# Patient Record
Sex: MALE | Race: WHITE | NOT HISPANIC OR LATINO | Employment: OTHER | URBAN - METROPOLITAN AREA
[De-identification: names, ages, dates, MRNs, and addresses within clinical notes are randomized per-mention and may not be internally consistent; named-entity substitution may affect disease eponyms.]

---

## 2023-12-07 ENCOUNTER — HOSPITAL ENCOUNTER (OUTPATIENT)
Dept: RADIATION ONCOLOGY | Facility: HOSPITAL | Age: 68
Setting detail: RADIATION/ONCOLOGY SERIES
Discharge: HOME | End: 2023-12-07
Payer: MEDICARE

## 2023-12-07 ENCOUNTER — OFFICE VISIT (OUTPATIENT)
Dept: RADIATION ONCOLOGY | Facility: HOSPITAL | Age: 68
End: 2023-12-07
Payer: MEDICARE

## 2023-12-07 VITALS
SYSTOLIC BLOOD PRESSURE: 148 MMHG | WEIGHT: 140.9 LBS | OXYGEN SATURATION: 95 % | HEIGHT: 68 IN | TEMPERATURE: 96.6 F | RESPIRATION RATE: 18 BRPM | BODY MASS INDEX: 21.35 KG/M2 | HEART RATE: 85 BPM | DIASTOLIC BLOOD PRESSURE: 85 MMHG

## 2023-12-07 DIAGNOSIS — C61 PROSTATE CANCER: Primary | ICD-10-CM

## 2023-12-07 PROCEDURE — G0463 HOSPITAL OUTPT CLINIC VISIT: HCPCS | Performed by: RADIOLOGY

## 2023-12-07 RX ORDER — ALBUTEROL SULFATE 90 UG/1
AEROSOL, METERED RESPIRATORY (INHALATION)
COMMUNITY

## 2023-12-07 RX ORDER — TAMSULOSIN HYDROCHLORIDE 0.4 MG/1
1 CAPSULE ORAL EVERY MORNING
COMMUNITY

## 2023-12-07 RX ORDER — LISINOPRIL 10 MG/1
10 TABLET ORAL EVERY MORNING
COMMUNITY
Start: 2023-10-17

## 2023-12-07 RX ORDER — FLUTICASONE FUROATE, UMECLIDINIUM BROMIDE AND VILANTEROL TRIFENATATE 100; 62.5; 25 UG/1; UG/1; UG/1
POWDER RESPIRATORY (INHALATION)
COMMUNITY

## 2023-12-07 NOTE — PROGRESS NOTES
CONSULTATION NOTE      :                                                          1955  DATE OF CONSULTATION:                       2023   REQUESTING PHYSICIAN:                   Gurpreet Hawley DO  REASON FOR CONSULTATION:           Prostate Cancer   Cancer Staging   Stage IIC (cT1c, cN0, cM0, PSA: 10.5, Grade Group: 4)    Thank you for requesting my services in evaluation of this pleasant individual.  I am seeing them in outpatient consultation regarding a diagnosis of prostate cancer.     BRIEF HISTORY:  The patient is a very pleasant 68 y.o. 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  This prompted further evaluation and he was seen by Dr. Hawley who performed a transrectal ultrasound-guided biopsy of the prostate.  This revealed a [place] 3+5 = 8 adenocarcinoma involving a single core from the left prostate involving 40% of the biopsy length, and a Joslyn 3+4 = 7 adenocarcinoma from the right side of the prostate involving 4% of the total biopsy length.  He then underwent a PSMA PET/CT scan which identified a small focus of hypermetabolic activity in the prostate gland itself, but no evidence of regional or distant disease.  He discussed several different treatment options and is now presenting for discussion related to definitive radiation therapy.  From a symptomatic standpoint, he reports an IPSS score of 10, most significant for nocturia x 3.  He is currently taking Flomax with good results.  He reports a high level of erectile function and he denies any ongoing gastrointestinal complaints.    Allergy: No Known Allergies    Social History:   Social History     Socioeconomic History    Marital status:    Tobacco Use    Smoking status: Former     Types: Cigarettes    Smokeless tobacco: Never    Tobacco comments:     Quit 10  years ago   Substance and Sexual Activity    Alcohol use: Not Currently    Drug use: Never    Sexual activity: Defer       Past Medical History:   Past Medical History:   Diagnosis Date    Black lung     Hypertension     Prostate cancer        Family History: family history includes Colon cancer in his brother; No Known Problems in his mother.     Surgical History:   Past Surgical History:   Procedure Laterality Date    COLONOSCOPY      6 years    HERNIA REPAIR          Review of Systems:   Review of Systems   Musculoskeletal:  Positive for arthralgias.   All other systems reviewed and are negative.      IPSS Questionnaire (AUA-7):  Over the past month…    1)  Incomplete Emptying  How often have you had a sensation of not emptying your bladder?  1 - Less than 1 time in 5   2)  Frequency  How often have you had to urinate less than every two hours? 1 - Less than 1 time in 5   3)  Intermittency  How often have you found you stopped and started again several times when you urinated?  1 - Less than 1 time in 5   4) Urgency  How often have you found it difficult to postpone urination?  2 - Less than half the time   5) Weak Stream  How often have you had a weak urinary stream?  1 - Less than 1 time in 5   6) Straining  How often have you had to push or strain to begin urination?  1 - Less than 1 time in 5   7) Nocturia  How many times did you typically get up at night to urinate?  3 - 3 times   Total Score:  10       Quality of life due to urinary symptoms:  If you were to spend the rest of your life with your urinary condition the way it is now, how would you feel about that? 2-Mostly Satisfied   Urine Leakage (Incontinence) 0-No Leakage     Sexual Health Inventory  Current Status    1)  How do you rate your confidence that you could achieve and keep an erection? 5-Very High   2) When you had erections with sexual stimulation, how often were your erections hard enough for penetration (entering your partner)? 5-Almost  "always or always   3)  During sexual intercourse, how often were you able to maintain your erection after you had penetrated (entered) into your partner? 5-Almost always or always   4) During sexual intercourse, how difficult was it to maintain your erection to completion of intercourse? 5-Not difficult   5) When you attempted sexual intercourse, how often was it satisfactory to you? 5-Almost always or always   Total Score: 25       Bowel Health Inventory  Current Status: 0-No problems, no rectal bleeding, no discharge, less then 5 bowel movements a day        Objective   VITAL SIGNS:   Vitals:    12/07/23 1321   BP: 148/85   Pulse: 85   Resp: 18   Temp: 96.6 °F (35.9 °C)   TempSrc: Temporal   SpO2: 95%   Weight: 63.9 kg (140 lb 14.4 oz)   Height: 172.7 cm (68\")   PainSc: 0-No pain        Karnofsky score: 80       Physical Exam:   Physical Exam  Vitals and nursing note reviewed.   Constitutional:       General: He is not in acute distress.     Appearance: He is well-developed.   HENT:      Head: Normocephalic and atraumatic.   Eyes:      Conjunctiva/sclera: Conjunctivae normal.      Pupils: Pupils are equal, round, and reactive to light.   Cardiovascular:      Rate and Rhythm: Normal rate and regular rhythm.      Heart sounds: No murmur heard.     No friction rub.   Pulmonary:      Effort: Pulmonary effort is normal.      Breath sounds: Normal breath sounds. No wheezing.   Abdominal:      General: Bowel sounds are normal. There is no distension.      Palpations: Abdomen is soft. There is no mass.      Tenderness: There is no abdominal tenderness.   Musculoskeletal:         General: Normal range of motion.      Cervical back: Normal range of motion and neck supple.   Lymphadenopathy:      Cervical: No cervical adenopathy.   Skin:     General: Skin is warm and dry.   Neurological:      Mental Status: He is alert and oriented to person, place, and time.   Psychiatric:         Behavior: Behavior normal.         Thought " Content: Thought content normal.         Judgment: Judgment normal.     PATHOLOGY   Transrectal ultrasound-guided biopsy of the prostate 10/2/2023:  Needle core biopsies, left prostate: [place] 3+5 = 8, involving 40% of the biopsy length  Needle core biopsies of the right: Joslyn 3+4 = 7 adenocarcinoma involving 4% of the biopsy    LABORATORY   PSA 5/8/2023: 9.29 NG/mL  PSA 9/11/2023: 10.5 NG/mL    IMAGING  I have personally reviewed the relevant imaging studies, as follows:  PSMA PET/CT scan 11/3/2023:  No significant hypermetabolic foci in the neck, chest, or abdomen.  There is a small focus of hypermetabolic activity within the prostate gland with a maximum SUV of 6.4.       The following portions of the patient's history were reviewed and updated as appropriate: allergies, current medications, past family history, past medical history, past social history, past surgical history, and problem list.    Assessment:   Assessment Mr. Kendrick is a 68-year-old gentleman with a clinical T1c, mixed [place] 3+4 = 7 and 3+5 = 8 adenocarcinoma, with a pretreatment PSA of 10.5 NG/mL.  He has what appears to be disease limited to the prostate based on PET/CT scan.   I met with the patient and his daughter today, discussing the different treatment options for an intermediate/high risk prostate cancer.  Specifically, we discussed radical prostatectomy, interstitial brachytherapy, a standard course of fractionated radiation therapy, and stereotactic body radiation therapy using the CyberKnife treatment unit, with a discussion regarding the logistics, and short term and long term risks of each modality.  He was most interested today in treatment using stereotactic body radiation therapy, and after a full explanation of the risks and benefits, he signed informed consent.  He will need to have fiducials placed, so I will coordinate for him to be seen again by his Urologist for this.  In general, we need 4-5 fiducial markers in order  for the Cyberknife to accurately track the prostate during treatment.  I think he will also benefit from SpaceOAR placement. While he is being arranged for that, we will have his outside pathology reviewed, and I anticipate that he will be a good candidate for Cyberknife Radiosurgery.  Once his fiducials have been placed, I will coordinate for him to return to my clinic for re-evaluation.  On that day, he will complete an MRI of the Prostate, and a Cyberknife planning session.  I anticipate treating his prostate to 35Gy in 5 fractions of 7Gy each.  I also discussed the necessary bowel prep, low fiber and gas diet, and using alpha blockers during treatment.       Despite his medical comorbidities, he has a calculated healthy life expectancy that exceeds 10 years, therefore I do believe treatment is warranted.    RECOMMENDATIONS:    1.  Outside pathology review of his prostate biopsy  2.  Refer back to Dr. Hawley for gold seed fiducial placement and SpaceOAR  3.  Return to clinic for Cyberknife treatment planning    I spent a total of 60 minutes on todays visit, with more than 45 minutes in direct face to face communication, and the remainder of the time spent in reviewing the relevant history, records, available imaging, and for documentation.    Follow Up:   Return in about 3 weeks (around 12/28/2023) for Office Visit, Imaging - See orders, Radiation Simulation.  Diagnoses and all orders for this visit:    1. Prostate cancer (Primary)  -     Tissue Pathology Exam; Future    Other orders  -     SCANNED PATHOLOGY  -     SCANNED - IMAGING  -     SCANNED - LABS  -     SCANNED - IMAGING    Thank you for allowing me to participate in the care of this individual.    Sincerely,       Warren Brooks MD

## 2023-12-11 DIAGNOSIS — C61 PROSTATE CANCER: Primary | ICD-10-CM

## 2023-12-12 ENCOUNTER — TELEPHONE (OUTPATIENT)
Dept: RADIATION ONCOLOGY | Facility: HOSPITAL | Age: 68
End: 2023-12-12
Payer: MEDICARE

## 2023-12-12 DIAGNOSIS — C61 PROSTATE CANCER: Primary | ICD-10-CM

## 2023-12-12 NOTE — TELEPHONE ENCOUNTER
Pt wife notified of the following appts:  1/8/24- Markers/spaceOAR with Dr. Hawley-(office to provide instructions/directions for appt.)  1/19/24@ 10:00 clinic  @11:00 ct sim  @ 12:00 MRI  Educated on the importance of following the gas-eliminating diet with gas-x 4 times per day starting 1/17/24  Instructed to be NPO for 6 hours prior to MRI and to perform an enema the morning of 1/19/24    Verbalized understanding    Referral to Smiley Ruiz RD

## 2024-01-11 ENCOUNTER — DOCUMENTATION (OUTPATIENT)
Dept: NUTRITION | Facility: HOSPITAL | Age: 69
End: 2024-01-11
Payer: MEDICARE

## 2024-01-19 ENCOUNTER — OFFICE VISIT (OUTPATIENT)
Dept: RADIATION ONCOLOGY | Facility: HOSPITAL | Age: 69
End: 2024-01-19
Payer: MEDICARE

## 2024-01-19 ENCOUNTER — HOSPITAL ENCOUNTER (OUTPATIENT)
Dept: RADIATION ONCOLOGY | Facility: HOSPITAL | Age: 69
Setting detail: RADIATION/ONCOLOGY SERIES
Discharge: HOME | End: 2024-01-19
Payer: MEDICARE

## 2024-01-19 ENCOUNTER — HOSPITAL ENCOUNTER (OUTPATIENT)
Dept: RADIATION ONCOLOGY | Facility: HOSPITAL | Age: 69
Discharge: HOME | End: 2024-01-19

## 2024-01-19 ENCOUNTER — HOSPITAL ENCOUNTER (OUTPATIENT)
Dept: MRI IMAGING | Facility: HOSPITAL | Age: 69
Discharge: HOME | End: 2024-01-19
Admitting: RADIOLOGY
Payer: MEDICARE

## 2024-01-19 VITALS
HEART RATE: 80 BPM | DIASTOLIC BLOOD PRESSURE: 92 MMHG | SYSTOLIC BLOOD PRESSURE: 151 MMHG | OXYGEN SATURATION: 95 % | BODY MASS INDEX: 20.75 KG/M2 | WEIGHT: 136.5 LBS | TEMPERATURE: 96 F | RESPIRATION RATE: 18 BRPM

## 2024-01-19 DIAGNOSIS — C61 PROSTATE CANCER: Primary | ICD-10-CM

## 2024-01-19 DIAGNOSIS — C61 PROSTATE CANCER: ICD-10-CM

## 2024-01-19 PROCEDURE — 77399 UNLISTED PX MED RADJ PHYSICS: CPT | Performed by: RADIOLOGY

## 2024-01-19 PROCEDURE — G0463 HOSPITAL OUTPT CLINIC VISIT: HCPCS

## 2024-01-19 PROCEDURE — 72195 MRI PELVIS W/O DYE: CPT

## 2024-01-19 NOTE — PROGRESS NOTES
RE-EVALUATION    PATIENT:                                                      Mike Kendrick  :                                                          1955  DATE:                          2024   DIAGNOSIS:     Prostate cancer  - Stage IIC (cT1c, cN0, cM0, PSA: 10.5, Grade Group: 4)     BRIEF HISTORY:  The patient is a very pleasant 68 y.o. male  with a known history of localized prostate cancer.  I last saw him approximately 1 month ago and we discussed several different treatment options, and given the small volume of disease, he has elected to be treated with CyberKnife radiosurgery.  He has since underwent gold seed fiducial placement as well as SpaceOAR hydrogel, and he returns to my clinic today for reevaluation prior to undergoing treatment planning.  He denies any new or different symptoms.  His IPSS score today is 10, and he remains on Flomax daily.    No Known Allergies    Review of Systems   Musculoskeletal:  Positive for arthralgias.   All other systems reviewed and are negative.        IPSS Questionnaire (AUA-7):  Over the past month…     1)  Incomplete Emptying  How often have you had a sensation of not emptying your bladder?  1 - Less than 1 time in 5   2)  Frequency  How often have you had to urinate less than every two hours? 1 - Less than 1 time in 5   3)  Intermittency  How often have you found you stopped and started again several times when you urinated?  1 - Less than 1 time in 5   4) Urgency  How often have you found it difficult to postpone urination?  2 - Less than half the time   5) Weak Stream  How often have you had a weak urinary stream?  1 - Less than 1 time in 5   6) Straining  How often have you had to push or strain to begin urination?  1 - Less than 1 time in 5   7) Nocturia  How many times did you typically get up at night to urinate?  3 - 3 times   Total Score:  10         Quality of life due to urinary symptoms:  If you were to spend the rest of your life with  your urinary condition the way it is now, how would you feel about that? 2-Mostly Satisfied   Urine Leakage (Incontinence) 0-No Leakage      Sexual Health Inventory  Current Status     1)  How do you rate your confidence that you could achieve and keep an erection? 5-Very High   2) When you had erections with sexual stimulation, how often were your erections hard enough for penetration (entering your partner)? 5-Almost always or always   3)  During sexual intercourse, how often were you able to maintain your erection after you had penetrated (entered) into your partner? 5-Almost always or always   4) During sexual intercourse, how difficult was it to maintain your erection to completion of intercourse? 5-Not difficult   5) When you attempted sexual intercourse, how often was it satisfactory to you? 5-Almost always or always   Total Score: 25         Bowel Health Inventory  Current Status: 0-No problems, no rectal bleeding, no discharge, less then 5 bowel movements a day                    Objective   VITAL SIGNS:   Vitals:    01/19/24 0944   BP: 151/92   Pulse: 80   Resp: 18   Temp: 96 °F (35.6 °C)   TempSrc: Temporal   SpO2: 95%   Weight: 61.9 kg (136 lb 8 oz)   PainSc: 0-No pain        Karnofsky score: 80       Physical Exam  Vitals and nursing note reviewed.   Constitutional:       General: He is not in acute distress.     Appearance: He is well-developed.   HENT:      Head: Normocephalic and atraumatic.   Eyes:      Conjunctiva/sclera: Conjunctivae normal.      Pupils: Pupils are equal, round, and reactive to light.   Cardiovascular:      Rate and Rhythm: Normal rate and regular rhythm.      Heart sounds: No murmur heard.     No friction rub.   Pulmonary:      Effort: Pulmonary effort is normal.      Breath sounds: Normal breath sounds. No wheezing.   Abdominal:      General: Bowel sounds are normal. There is no distension.      Palpations: Abdomen is soft. There is no mass.      Tenderness: There is no  abdominal tenderness.   Musculoskeletal:         General: Normal range of motion.      Cervical back: Normal range of motion and neck supple.   Lymphadenopathy:      Cervical: No cervical adenopathy.   Skin:     General: Skin is warm and dry.   Neurological:      Mental Status: He is alert and oriented to person, place, and time.   Psychiatric:         Behavior: Behavior normal.         Thought Content: Thought content normal.         Judgment: Judgment normal.            The following portions of the patient's history were reviewed and updated as appropriate: allergies, current medications, past family history, past medical history, past social history, past surgical history, and problem list.    Diagnoses and all orders for this visit:    Prostate cancer      IMPRESSION: Mr. Kendrick is a 68-year-old gentleman with a clinical T1c, [place] 3+5 = 8, small volume prostate cancer with a pretreatment PSA of 10.5 NG/mL.  He has underwent a PET/CT scan demonstrating confined disease within the prostate.  He completed CyberKnife simulation and treatment planning today.  I will be working on his treatment plan with my physicist over the next week.  I anticipate treating his prostate to a dose of 35 Gy in 5 fractions of 7 Gy each.  I reiterated the importance of the low fiber diet, daily bowel prep, and prophylactic treatment with alpha-blockers in terms of managing his acute side effects.  I sent a prescription of Flomax to his pharmacy.  He should be ready to begin treatment in the next 1-2 weeks pending insurance authorization.     RECOMMENDATIONS: Return to clinic in 1-2 weeks to begin CyberKnife treatments.    I spent a total of 30 minutes on today's visit, with 20 minutes in direct face-to-face communication, and the remainder of time spent reviewing his recent medical records and for clinical documentation.       Warren Brooks MD

## 2024-01-23 ENCOUNTER — DOCUMENTATION (OUTPATIENT)
Dept: ONCOLOGY | Facility: CLINIC | Age: 69
End: 2024-01-23
Payer: MEDICARE

## 2024-01-23 NOTE — SIGNIFICANT NOTE
SW contacted pt to discuss [facility] for upcoming tx. Pt explained he does not have credit card to pay for hotel, and is worried about lodging. SW educated pt on [facility] and explained it is at no costs to him. Pt expressed appreciation and communicated his ex-wife Cecy will be accompanying him to appointments. SW completed referral and will send once tx plan has been established. SW educated pt on role and resources, and inquired about other psychosocial needs. Pt denied further needs, and thanked TAB for the call and information.       01/23/24 1000   Oncology Interventions   Practical Needs Lodging Assistance  ([facility] tx.)

## 2024-01-26 PROCEDURE — 77301 RADIOTHERAPY DOSE PLAN IMRT: CPT | Performed by: RADIOLOGY

## 2024-01-26 PROCEDURE — 77300 RADIATION THERAPY DOSE PLAN: CPT | Performed by: RADIOLOGY

## 2024-01-26 PROCEDURE — 77338 DESIGN MLC DEVICE FOR IMRT: CPT | Performed by: RADIOLOGY

## 2024-01-29 NOTE — PROGRESS NOTES
TAB contacted pt's spouse to confirm that [facility] referral was sent for 2/4-2/9. SW provided pt's spouse with phone number and address to [facility], and reported they will be calling to confirm stay. Pt's spouse verbalized understanding and thanked TAB.

## 2024-02-05 ENCOUNTER — HOSPITAL ENCOUNTER (OUTPATIENT)
Dept: RADIATION ONCOLOGY | Facility: HOSPITAL | Age: 69
Setting detail: RADIATION/ONCOLOGY SERIES
End: 2024-02-05
Payer: MEDICARE

## 2024-02-05 ENCOUNTER — HOSPITAL ENCOUNTER (OUTPATIENT)
Dept: RADIATION ONCOLOGY | Facility: HOSPITAL | Age: 69
Setting detail: RADIATION/ONCOLOGY SERIES
Discharge: HOME | End: 2024-02-05
Payer: MEDICARE

## 2024-02-05 PROCEDURE — 77373 STRTCTC BDY RAD THER TX DLVR: CPT | Performed by: RADIOLOGY

## 2024-02-06 ENCOUNTER — HOSPITAL ENCOUNTER (OUTPATIENT)
Dept: RADIATION ONCOLOGY | Facility: HOSPITAL | Age: 69
Discharge: HOME | End: 2024-02-06

## 2024-02-06 PROCEDURE — 77373 STRTCTC BDY RAD THER TX DLVR: CPT | Performed by: RADIOLOGY

## 2024-02-07 ENCOUNTER — HOSPITAL ENCOUNTER (OUTPATIENT)
Dept: RADIATION ONCOLOGY | Facility: HOSPITAL | Age: 69
Discharge: HOME | End: 2024-02-07

## 2024-02-07 PROCEDURE — 77373 STRTCTC BDY RAD THER TX DLVR: CPT | Performed by: RADIOLOGY

## 2024-02-08 ENCOUNTER — HOSPITAL ENCOUNTER (OUTPATIENT)
Dept: RADIATION ONCOLOGY | Facility: HOSPITAL | Age: 69
Discharge: HOME | End: 2024-02-08

## 2024-02-08 PROCEDURE — 77373 STRTCTC BDY RAD THER TX DLVR: CPT | Performed by: RADIOLOGY

## 2024-02-09 ENCOUNTER — HOSPITAL ENCOUNTER (OUTPATIENT)
Dept: RADIATION ONCOLOGY | Facility: HOSPITAL | Age: 69
Discharge: HOME | End: 2024-02-09

## 2024-02-09 PROCEDURE — 77336 RADIATION PHYSICS CONSULT: CPT | Performed by: RADIOLOGY

## 2024-02-09 PROCEDURE — 77373 STRTCTC BDY RAD THER TX DLVR: CPT | Performed by: RADIOLOGY

## 2024-02-20 NOTE — PROGRESS NOTES
RADIATION ONCOLOGY PROGRESS NOTE  02/20/24    Pt calling with c/o hemorroid irritation- requesting proctofoam hc-after discussion with Dr. Brooks I called pt to notify him a script has been sent to his pharmacy per Dr. Brooks-verbalized understanding

## 2024-03-14 ENCOUNTER — HOSPITAL ENCOUNTER (OUTPATIENT)
Dept: RADIATION ONCOLOGY | Facility: HOSPITAL | Age: 69
Setting detail: RADIATION/ONCOLOGY SERIES
Discharge: HOME | End: 2024-03-14
Payer: MEDICARE

## 2024-03-14 ENCOUNTER — OFFICE VISIT (OUTPATIENT)
Dept: RADIATION ONCOLOGY | Facility: HOSPITAL | Age: 69
End: 2024-03-14
Payer: MEDICARE

## 2024-03-14 DIAGNOSIS — C61 PROSTATE CANCER: Primary | ICD-10-CM

## 2024-03-14 PROBLEM — R35.0 BENIGN PROSTATIC HYPERPLASIA WITH URINARY FREQUENCY: Status: ACTIVE | Noted: 2023-07-13

## 2024-03-14 PROBLEM — R73.03 PREDIABETES: Status: ACTIVE | Noted: 2023-06-19

## 2024-03-14 PROBLEM — G89.29 CHRONIC LOW BACK PAIN: Status: ACTIVE | Noted: 2023-04-13

## 2024-03-14 PROBLEM — I10 HYPERTENSIVE DISORDER: Status: ACTIVE | Noted: 2023-04-13

## 2024-03-14 PROBLEM — J44.9 SEVERE CHRONIC OBSTRUCTIVE PULMONARY DISEASE: Status: ACTIVE | Noted: 2023-04-13

## 2024-03-14 PROBLEM — M54.50 CHRONIC LOW BACK PAIN: Status: ACTIVE | Noted: 2023-04-13

## 2024-03-14 PROBLEM — K76.89 LIVER CYST: Status: ACTIVE | Noted: 2023-04-13

## 2024-03-14 PROBLEM — J45.909 ASTHMA: Status: ACTIVE | Noted: 2023-04-13

## 2024-03-14 PROBLEM — K64.9 HEMORRHOIDS: Status: ACTIVE | Noted: 2024-03-04

## 2024-03-14 PROBLEM — L21.9 SEBORRHEIC DERMATITIS OF SCALP: Status: ACTIVE | Noted: 2024-03-04

## 2024-03-14 PROBLEM — R73.9 HYPERGLYCEMIA: Status: ACTIVE | Noted: 2023-05-15

## 2024-03-14 PROBLEM — E78.5 DYSLIPIDEMIA: Status: ACTIVE | Noted: 2023-04-13

## 2024-03-14 PROBLEM — N40.1 BENIGN PROSTATIC HYPERPLASIA WITH URINARY FREQUENCY: Status: ACTIVE | Noted: 2023-07-13

## 2024-03-14 PROBLEM — I71.21 ANEURYSM OF ASCENDING AORTA: Status: ACTIVE | Noted: 2023-04-13

## 2024-03-14 PROBLEM — R35.1 NOCTURIA: Status: ACTIVE | Noted: 2023-04-13

## 2024-03-14 RX ORDER — HYDROCORTISONE 25 MG/G
1 CREAM TOPICAL 2 TIMES DAILY
COMMUNITY
Start: 2024-02-20

## 2024-03-14 RX ORDER — KETOCONAZOLE 20 MG/ML
1 SHAMPOO TOPICAL 2 TIMES WEEKLY
COMMUNITY
Start: 2024-03-04

## 2024-03-14 NOTE — PROGRESS NOTES
FOLLOW UP NOTE    PATIENT:                                                      Mike Kendrick  MEDICAL RECORD #:                        [id number]  :                                                          1955  COMPLETION DATE:   2024  DIAGNOSIS:     Prostate cancer  - Stage IIC (cT1c, cN0, cM0, PSA: 10.5, Grade Group: 4)    This visit has been converted to a telehealth virtual visit, the patient's preferred method for today's follow-up.  Total time of discussion was 15 minutes.  The patient has given verbal consent.      BRIEF HISTORY:    Mike Kendrick is a 68-year-old male with a history of intermediate risk prostate cancer.  He was treated with CyberKnife radiosurgery, 35 Gy in 5 fractions, and completed treatment on 2024.  The patient tolerated treatment well.    About 1 week after treatment he developed frequency, dysuria, increased nocturia, pelvic pain, diarrhea, x 1 episode of vomiting.  He reports that his symptoms have greatly improved.  He was taking Azo 3 times daily but is now down to taking it once daily.  Diarrhea has resolved.  Continues to take tamsulosin.  He continues to experience mild dysuria and nocturia.  He reports no issues with erection.  But otherwise he has returned to his baseline urinary function.  He denies fevers, chills, and pelvic pain.    MEDICATIONS: Medication reconciliation for the patient was reviewed and confirmed in the electronic medical record.    Review of Systems:   Review of Systems   Gastrointestinal: Negative.    Genitourinary:  Positive for difficulty urinating, dysuria and nocturia. Negative for bladder incontinence, dyspareunia, frequency, hematuria and pelvic pain.         Symptoms Improving        Physical Exam:   Pulmonary:      Respirations even, unlabored. No audible wheezing or cough.  Neurological:      A+Ox4, conversant, answers questions appropriately.  Psychiatric:     Judgement, affect, and decision-making WNL.    Limited physical  exam as visit was conducted remotely via telephone.    VITAL SIGNS: N/A-virtual visit              KPS %:  80    The following portions of the patient's history were reviewed and updated as appropriate: allergies, current medications, past family history, past medical history, past social history, past surgical history and problem list.            IMPRESSION: Mike Kendrick is a 68-year-old male with clinical T1c, Joslyn 3+5 = 8, small volume prostate cancer with a pretreatment PSA of 10.5 NG/mL.  He underwent treatment with CyberKnife SBRT, completed on 2/9/2024.  He tolerated treatment well.  About 1 week after radiation treatment he developed the expected grade 1 urinary symptoms.  He reports he was mostly back to his baseline urinary function.  He continues to experience mild burning with urination but has been able to back down his Azo use 3 times to once daily.  He continues to take tamsulosin.  He has follow-up scheduled with his urologist, Dr. Hawley on 6/3/2024.  The patient understands importance of biannual PSA testing.    We reviewed the prostate survivorship packet.  All the patient's questions and concerns were addressed.    RECOMMENDATIONS: He is scheduled for an office visit with Dr. Brooks on 9/20/2024.  He understands importance of following with his urologist for biannual PSA testing.      25 minutes in virtual communication with the patient via telephone, and the remainder of the time spent in reviewing the relevant history, records, available imaging, and for documentation.             NIMO White    Errors in dictation may reflect use of voice recognition software and not all errors in transcription may have been detected prior to signing.

## 2024-09-20 ENCOUNTER — HOSPITAL ENCOUNTER (OUTPATIENT)
Dept: RADIATION ONCOLOGY | Facility: HOSPITAL | Age: 69
Setting detail: RADIATION/ONCOLOGY SERIES
Discharge: HOME | End: 2024-09-20
Payer: MEDICARE

## 2024-09-20 ENCOUNTER — OFFICE VISIT (OUTPATIENT)
Dept: RADIATION ONCOLOGY | Facility: HOSPITAL | Age: 69
End: 2024-09-20
Payer: MEDICARE

## 2024-09-20 DIAGNOSIS — C61 PROSTATE CANCER: Primary | ICD-10-CM

## 2025-03-20 ENCOUNTER — HOSPITAL ENCOUNTER (OUTPATIENT)
Dept: RADIATION ONCOLOGY | Facility: HOSPITAL | Age: 70
Setting detail: RADIATION/ONCOLOGY SERIES
Discharge: HOME | End: 2025-03-20
Payer: MEDICARE

## 2025-03-20 ENCOUNTER — CLINICAL SUPPORT (OUTPATIENT)
Dept: RADIATION ONCOLOGY | Facility: HOSPITAL | Age: 70
End: 2025-03-20
Payer: MEDICARE

## 2025-03-20 DIAGNOSIS — C61 PROSTATE CANCER: Primary | ICD-10-CM

## 2025-03-20 NOTE — PROGRESS NOTES
TELEMEDICINE FOLLOW UP NOTE    PATIENT:                                                      Mike Kendrick  MEDICAL RECORD #:                        [id number]  :                                                          1955  COMPLETION DATE:   2024  DIAGNOSIS:     Prostate cancer  - Stage IIC (cT1c, cN0, cM0, PSA: 10.5, Grade Group: 4)    This visit has been converted to a telehealth virtual visit, the patient's preferred method for today's follow-up.  Total time of discussion was 17 minutes.  The patient has given verbal consent.      BRIEF HISTORY:  Mike Kendrick is a 69 y.o. gentleman presenting via telemedicine for scheduled follow-up visit.  He has a known history of an intermediate risk prostate cancer with what appears to be disease confined within the prostate based on staging PET/CT scan.  He underwent definitive treatment with a course of CyberKnife stereotactic radiosurgery, completing 2024.  He tolerated treatment well.  From a current symptom standpoint, he describes minimal lower urinary tract symptoms which have, for the most part, returned to baseline.  He reports overall improvement in urinary frequency and nocturia x2-3, down from nocturia x5-6.  He describes strong urinary stream and good bladder emptying.  He denies gross hematuria, dysuria, or urinary incontinence.  He continues to use of Flomax nightly.  He denies change in bowel function, hematochezia, or acute GI complaints.  He endorses chronic, moderate ED but is not interested in trial of PDE-5 inhibitors today.  Overall, he feels well and denies unexplained weight loss, focal bony pains, or additional acute concerns today.  Posttreatment PSA on 2024 with his PCP decreased to a value of 3.74 NG/mL, further decreased to a value of 2.21 NG/mL on 2025.        MEDICATIONS: Medication reconciliation for the patient was reviewed and confirmed in the electronic medical record.    Review of Systems   Genitourinary:   Positive for nocturia.    Musculoskeletal:  Positive for arthralgias.   All other systems reviewed and are negative.          KPS 80%      Physical Exam  Pulmonary:      Respirations even, unlabored. No audible wheezing or cough.  Neurological:      A+Ox4, conversant, answers questions appropriately.  Psychiatric:     Judgement, affect, and decision-making WNL.    Limited physical exam as visit was conducted remotely via telephone.            The following portions of the patient's history were reviewed and updated as appropriate: allergies, current medications, past family history, past medical history, past social history, past surgical history and problem list.         Diagnoses and all orders for this visit:    1. Prostate cancer (Primary)         IMPRESSION:  Mr. Kendrick is a 69 y.o. gentleman with a clinical T1c, Joslyn 3+5 = 8, small volume prostate cancer with a pretreatment PSA of 10.5 NG/mL.  He is 1 year status post CyberKnife SBRT.  He tolerated treatment well and has recovered nicely from the initial radiation related toxicities.  Urinary function continues to improve/stabilize.  We discussed attempt to taper/discontinue Flomax, as tolerated.  PSA continues to appropriately decline, with most recent value of 2.211 NG/mL.  PSA has likely not yet reached lupillo value, and should hopefully/expectedly continue to decline towards target lupillo value of <0.5 NG/mL over the next year or two.  As long as PSA is trending down, then he should continue PSA monitoring q6 months which is being orchestrated through his PCP, Dr. Martell.  We discussed that in the event of 2 consecutively rising PSA values exceeding 2 or more points above lupillo value, then he would need to return to our clinic for further workup and imaging at that time.  Hopefully, PSA will continue to decline and we will see the patient back in our clinic in 1 year or sooner as needed.    RECOMMENDATIONS:  Recommend biannual PSA surveillance which is  being orchestrated through the patient's PCP, Dr. Martell.  We will schedule 1 year follow up by phone as long as PSA is appropriately declining.       Return in about 1 year (around 3/20/2026) for Office Visit.    NIMO Pérez spent a total of 35 minutes on today's visit, with more than 17 minutes in virtual communication with the patient via telephone, and the remainder of the time spent in reviewing the relevant history, records, available imaging, and for documentation.

## 2025-05-12 ENCOUNTER — TELEPHONE (OUTPATIENT)
Dept: RADIATION ONCOLOGY | Facility: HOSPITAL | Age: 70
End: 2025-05-12
Payer: MEDICARE

## 2025-05-12 NOTE — TELEPHONE ENCOUNTER
Patient requested to have a call  back from [facility] (Evita) to discuss some new symptoms he is having and has noticed some weight loss. Advised I would send Evita a message and she would return his call shortly. Call Back # [phone]  Understanding was voiced w/ no further questions at this time.

## 2025-05-12 NOTE — TELEPHONE ENCOUNTER
"15 minute phone call re: return patient's call regarding symptoms    Patient c/o worsening \"abdominal tugging\" and occasional pain involving RLQ, 2.5 inches below level of umbilicus, that has radiated upwards toward \"center of abdomen\".  He mentions that he had an abdominal surgery at age 9 and feels like this pain originates at that prior surgical site.  He is also having some increased heartburn/reflux symptoms which are partially improved with consuming baking soda.  He notes 12 pound weight loss over the past 3 months but has regained 2 pounds in the past 2 weeks with diet.  He is concerned and requests advice.  I explained that these symptoms do not sound radiation-induced, being that he is >1 year out from his CyberKnife SBRT and symptoms are occurring outside the field of radiation.  He states he is concerned the cancer might be causing these symptoms.  I offered reassurance that his PSA is coming down nicely and these do not sound like symptoms I would expect from prostate cancer as they sound like they are more likely to be gastrointestinal in etiology.  He can get another PSA with his PCP as it has been 3 months and if it is going down as expected, this may offer some reassurance to the patient.  I advised him to address aforementioned concerns with his PCP who can consider further work up to possibly include CT abdomen pelvis, CBC/CMP,  referral to GI for consideration of upper/lower endoscopy, etc.  The patient voices gratitude for phone call and denies additional concerns.  He was encouraged to call back for other questions or concerns at any time.  "